# Patient Record
Sex: MALE | Race: WHITE
[De-identification: names, ages, dates, MRNs, and addresses within clinical notes are randomized per-mention and may not be internally consistent; named-entity substitution may affect disease eponyms.]

---

## 2019-01-02 ENCOUNTER — HOSPITAL ENCOUNTER (OUTPATIENT)
Age: 59
End: 2019-01-02
Payer: COMMERCIAL

## 2019-01-02 DIAGNOSIS — R20.2: Primary | ICD-10-CM

## 2019-01-02 DIAGNOSIS — M25.522: ICD-10-CM

## 2019-01-02 PROCEDURE — 95886 MUSC TEST DONE W/N TEST COMP: CPT

## 2019-01-02 PROCEDURE — 95910 NRV CNDJ TEST 7-8 STUDIES: CPT

## 2023-08-27 PROBLEM — N52.9 MALE ERECTILE DISORDER OF ORGANIC ORIGIN: Status: ACTIVE | Noted: 2023-08-27

## 2023-08-27 PROBLEM — M25.512 SHOULDER PAIN, BILATERAL: Status: ACTIVE | Noted: 2023-08-27

## 2023-08-27 PROBLEM — M25.511 SHOULDER PAIN, BILATERAL: Status: ACTIVE | Noted: 2023-08-27

## 2023-08-27 PROBLEM — R06.83 SNORING: Status: ACTIVE | Noted: 2023-08-27

## 2023-08-27 PROBLEM — S09.93XA INJURY OF FACE: Status: ACTIVE | Noted: 2023-08-27

## 2023-08-27 PROBLEM — G45.9 BRAIN TIA: Status: ACTIVE | Noted: 2023-08-27

## 2023-08-27 RX ORDER — SILDENAFIL 50 MG/1
1 TABLET, FILM COATED ORAL AS NEEDED
COMMUNITY
Start: 2022-08-26 | End: 2023-09-13 | Stop reason: SDUPTHER

## 2023-08-28 ENCOUNTER — OFFICE VISIT (OUTPATIENT)
Dept: PRIMARY CARE | Facility: CLINIC | Age: 63
End: 2023-08-28
Payer: COMMERCIAL

## 2023-08-28 ENCOUNTER — LAB (OUTPATIENT)
Dept: LAB | Facility: LAB | Age: 63
End: 2023-08-28
Payer: COMMERCIAL

## 2023-08-28 VITALS
OXYGEN SATURATION: 99 % | HEIGHT: 71 IN | DIASTOLIC BLOOD PRESSURE: 80 MMHG | SYSTOLIC BLOOD PRESSURE: 120 MMHG | BODY MASS INDEX: 29.18 KG/M2 | HEART RATE: 64 BPM | WEIGHT: 208.4 LBS

## 2023-08-28 DIAGNOSIS — Z12.5 SCREENING FOR PROSTATE CANCER: ICD-10-CM

## 2023-08-28 DIAGNOSIS — Z00.00 WELLNESS EXAMINATION: Primary | ICD-10-CM

## 2023-08-28 DIAGNOSIS — Z00.00 WELLNESS EXAMINATION: ICD-10-CM

## 2023-08-28 LAB
ALANINE AMINOTRANSFERASE (SGPT) (U/L) IN SER/PLAS: 28 U/L (ref 10–52)
ALBUMIN (G/DL) IN SER/PLAS: 4.5 G/DL (ref 3.4–5)
ALKALINE PHOSPHATASE (U/L) IN SER/PLAS: 49 U/L (ref 33–136)
ANION GAP IN SER/PLAS: 11 MMOL/L (ref 10–20)
ASPARTATE AMINOTRANSFERASE (SGOT) (U/L) IN SER/PLAS: 25 U/L (ref 9–39)
BILIRUBIN TOTAL (MG/DL) IN SER/PLAS: 0.6 MG/DL (ref 0–1.2)
CALCIUM (MG/DL) IN SER/PLAS: 9.5 MG/DL (ref 8.6–10.3)
CARBON DIOXIDE, TOTAL (MMOL/L) IN SER/PLAS: 29 MMOL/L (ref 21–32)
CHLORIDE (MMOL/L) IN SER/PLAS: 102 MMOL/L (ref 98–107)
CREATININE (MG/DL) IN SER/PLAS: 0.83 MG/DL (ref 0.5–1.3)
ERYTHROCYTE DISTRIBUTION WIDTH (RATIO) BY AUTOMATED COUNT: 13.8 % (ref 11.5–14.5)
ERYTHROCYTE MEAN CORPUSCULAR HEMOGLOBIN CONCENTRATION (G/DL) BY AUTOMATED: 32 G/DL (ref 32–36)
ERYTHROCYTE MEAN CORPUSCULAR VOLUME (FL) BY AUTOMATED COUNT: 88 FL (ref 80–100)
ERYTHROCYTES (10*6/UL) IN BLOOD BY AUTOMATED COUNT: 5.41 X10E12/L (ref 4.5–5.9)
GFR MALE: >90 ML/MIN/1.73M2
GLUCOSE (MG/DL) IN SER/PLAS: 95 MG/DL (ref 74–99)
HEMATOCRIT (%) IN BLOOD BY AUTOMATED COUNT: 47.5 % (ref 41–52)
HEMOGLOBIN (G/DL) IN BLOOD: 15.2 G/DL (ref 13.5–17.5)
LEUKOCYTES (10*3/UL) IN BLOOD BY AUTOMATED COUNT: 7.1 X10E9/L (ref 4.4–11.3)
PLATELETS (10*3/UL) IN BLOOD AUTOMATED COUNT: 239 X10E9/L (ref 150–450)
POTASSIUM (MMOL/L) IN SER/PLAS: 4.5 MMOL/L (ref 3.5–5.3)
PROSTATE SPECIFIC ANTIGEN,SCREEN: 0.64 NG/ML (ref 0–4)
PROTEIN TOTAL: 7.1 G/DL (ref 6.4–8.2)
SODIUM (MMOL/L) IN SER/PLAS: 137 MMOL/L (ref 136–145)
UREA NITROGEN (MG/DL) IN SER/PLAS: 21 MG/DL (ref 6–23)

## 2023-08-28 PROCEDURE — 1036F TOBACCO NON-USER: CPT | Performed by: FAMILY MEDICINE

## 2023-08-28 PROCEDURE — 99396 PREV VISIT EST AGE 40-64: CPT | Performed by: FAMILY MEDICINE

## 2023-08-28 PROCEDURE — 36415 COLL VENOUS BLD VENIPUNCTURE: CPT

## 2023-08-28 PROCEDURE — 80053 COMPREHEN METABOLIC PANEL: CPT

## 2023-08-28 PROCEDURE — 84153 ASSAY OF PSA TOTAL: CPT

## 2023-08-28 PROCEDURE — 85027 COMPLETE CBC AUTOMATED: CPT

## 2023-08-28 NOTE — PROGRESS NOTES
"Subjective   Patient ID: Pro Pino is a 63 y.o. male who presents for Annual Exam.    HPI   Meds-0 nkda  Op-[facility]-fell off ladder    Alcohol-3/wk, tob-never, illicits-0 divord-monogamous  Kids aw, sibs x2 aw, dad dec chf late 70s, mom aw\"on a cruise right now!\"  Colon due 27    Review of Systems  General-no fatigue weight to within 10 pounds  ENT no problems with vision swallowing  Cardiac no chest pains palpitations change in exercise tolerance or capacity  Pulmonary no cough shortness of breath  GI no heartburn or abdominal pain  Musculoskeletal no joint pains    Objective   /80   Pulse 64   Ht 1.803 m (5' 11\")   Wt 94.5 kg (208 lb 6.4 oz)   SpO2 99%   BMI 29.07 kg/m²     Physical Exam  General:  Alert, No acute distress. Appears stated age  Eye:  Pupils are equal, round and reactive to light, Extraocular movements are intact, Normal conjunctiva.    Neck:  Supple, Non-tender, No carotid bruit, No jugular venous distention, No lymphadenopathy, No thyromegaly.    Respiratory:  Lungs are clear to auscultation, Respirations are non-labored, Breath sounds are equal.    Cardiovascular:  Normal rate, Regular rhythm, No murmur.    Gastrointestinal:  Soft, Non-tender, No organomegaly. No solid or pulsatile mass  Integumentary:  Warm, Dry. No concerning lesions on exposed areas  Neurologic:  Alert, Oriented.  Gross and fine motor intact, CN 2-12 intact  Psychiatric:  Cooperative, Appropriate mood & affect.  Assessment/Plan   Problem List Items Addressed This Visit    None  Visit Diagnoses       Wellness examination    -  Primary    Relevant Orders    CBC (Completed)    Comprehensive Metabolic Panel (Completed)    Follow Up In Primary Care - Established    Screening for prostate cancer        Relevant Orders    Prostate Specific Antigen, Screen (Completed)               "

## 2023-08-29 ENCOUNTER — TELEPHONE (OUTPATIENT)
Dept: PRIMARY CARE | Facility: CLINIC | Age: 63
End: 2023-08-29
Payer: COMMERCIAL

## 2023-09-13 ENCOUNTER — TELEPHONE (OUTPATIENT)
Dept: PRIMARY CARE | Facility: CLINIC | Age: 63
End: 2023-09-13
Payer: COMMERCIAL

## 2023-09-13 DIAGNOSIS — N52.9 MALE ERECTILE DISORDER OF ORGANIC ORIGIN: Primary | ICD-10-CM

## 2023-09-13 RX ORDER — SILDENAFIL 50 MG/1
50 TABLET, FILM COATED ORAL AS NEEDED
Qty: 10 TABLET | Refills: 2 | Status: SHIPPED | OUTPATIENT
Start: 2023-09-13

## 2024-01-12 ENCOUNTER — OFFICE VISIT (OUTPATIENT)
Dept: URGENT CARE | Facility: CLINIC | Age: 64
End: 2024-01-12
Payer: COMMERCIAL

## 2024-01-12 VITALS
HEART RATE: 62 BPM | SYSTOLIC BLOOD PRESSURE: 128 MMHG | OXYGEN SATURATION: 97 % | DIASTOLIC BLOOD PRESSURE: 85 MMHG | BODY MASS INDEX: 28.7 KG/M2 | WEIGHT: 205 LBS | TEMPERATURE: 97.9 F | HEIGHT: 71 IN | RESPIRATION RATE: 18 BRPM

## 2024-01-12 DIAGNOSIS — J20.9 ACUTE BRONCHITIS, UNSPECIFIED ORGANISM: Primary | ICD-10-CM

## 2024-01-12 PROCEDURE — 99213 OFFICE O/P EST LOW 20 MIN: CPT | Mod: 25 | Performed by: NURSE PRACTITIONER

## 2024-01-12 RX ORDER — BENZONATATE 100 MG/1
100-200 CAPSULE ORAL EVERY 8 HOURS PRN
Qty: 60 CAPSULE | Refills: 0 | Status: SHIPPED | OUTPATIENT
Start: 2024-01-12

## 2024-01-12 RX ORDER — AZITHROMYCIN 250 MG/1
TABLET, FILM COATED ORAL
Qty: 6 EACH | Refills: 0 | Status: SHIPPED | OUTPATIENT
Start: 2024-01-12

## 2024-01-12 NOTE — PROGRESS NOTES
[facility]  Ai ROBERTO Draper, APRN-CNP     Visit Note - 1/12/2024 10:05 AM   This note was generated with voice recognition software and may contain errors including spelling, grammar, syntax, and misrecognization of what was dictated.    Patient: Pro Pino, MRN: [id number], 63 y.o., male   PCP: Michael Bonilla MD  ------------------------------------  ALLERGIES: No Known Allergies     CURRENT MEDICATIONS:   Current Outpatient Medications   Medication Instructions    azithromycin (Zithromax Z-Philip) 250 mg tablet Take 2 tablets by mouth at once on day 1, then 1 tablet once a day on days 2-5. Take with a meal.    benzonatate (TESSALON) 100-200 mg, oral, Every 8 hours PRN, Do not crush or chew.    sildenafil (VIAGRA) 50 mg, oral, As needed, TAKE 1 TABLET DAILY 1 HOUR BEFORE NEEDED.     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   SOCIAL HX:    reports that he has never smoked. He has never used smokeless tobacco. Retired. Has a daughter. Tries to walk 3 miles daily.   ------------------------------------  CHIEF COMPLAINT:   Chief Complaint   Patient presents with    Cough     Cough, nasal drainage x 10 days       HISTORY OF PRESENT ILLNESS: The history was obtained from patientSudha Mast is a 63 y.o. male, who presents with a chief complaint of a persistent, non-productive cough, chest congestion, and fatigue x 1.5 weeks. Reports he had a sore throat, fevers/chills, and body aches at onset, but these has improved. Has mild nasal congestion (yellow mucus), ears  "occasionally feel full, and he still feels \"run down.\" Denies any headaches, abdominal pain, chest pain, wheezing/shortness of breath, rashes, urinary symptoms, nausea/vomiting, and diarrhea. Denies any lightheadedness or dizziness; no changes in mental status. No swelling in legs. Appetite is normal; is able to eat and drink fluids without difficulty; denies loss of sense of taste or smell. Reports symptoms are unchanged since onset- \"the cough just won't go away.\" Has been taking  Dayquil and Ibuprofen  without much relief; no other over-the-counter medications or home remedies for symptom management. No known ill contacts. Has received the COVID vaccine x 1 . No known history of COVID infection.  Is not a smoker.  No known history of asthma/COPD/respiratory issues.      REVIEW OF SYSTEMS:  10 systems reviewed negative with exception of history of present illness as listed above.    TODAY'S VITALS: /85 (BP Location: Left arm, Patient Position: Sitting, BP Cuff Size: Large adult)   Pulse 62   Temp 36.6 °C (97.9 °F) (Temporal)   Resp 18   Ht 1.803 m (5' 11\")   Wt 93 kg (205 lb)   SpO2 97%   BMI 28.59 kg/m²     PHYSICAL EXAMINATION:  General:  Mildly ill-appearing, well nourished male; alert and oriented; in no acute distress. Sitting comfortably on exam table. Non-dyspneic.   Eyes:  Pupils equal, round and reactive to light. No conjunctival erythema; no scleral icterus.   HENT: No sinus tenderness; + mild audible nasal congestion. Airway patent, Bilat TMs unremarkable, ear canals clear/unremarkable bilaterally. Nasal mucosa mildly injected and edematous. Oral mucosa moist. Posterior pharynx mildly injected but without vesicles or oropharyngeal exudate aside from PND. Uvula is midline. Managing oral secretions without difficulty.  Neck:  Supple. Tender, mobile anterior cervical lymphadenopathy bilat. Trachea is midline.  Respiratory:  Respirations easy and unlabored, Breath sounds equal. Lungs clear to " auscultation; no wheezing, rhonchi, or rales. Has good air movement throughout. Harsh, non-productive cough noted- having coughing fits during visit. Non-dyspneic with ambulation; able to maintain SpO2.  Cardiovascular:  Normal rate, Regular rhythm. Normal S1S2. No m/r/g. No peripheral edema.   Gastrointestinal:  Soft, non-tender, non-distended; no palpable masses or organomegaly. Bowel sounds normoactive.  Musculoskeletal:  Grossly normal; appropriate for age.     Integumentary:  Pink, warm, dry, and intact. No rashes or skin discoloration appreciated. Good skin turgor.  Neurologic:  Alert and oriented, no gross deficits.    Cognition and Speech:  Oriented, Speech clear and coherent.    Psychiatric:  Cooperative, Appropriate mood & affect.       ------------------------------------  Medical Decision Making  LABORATORY or RADIOLOGICAL IMAGING ORDERS/RESULTS:   None.    IMPRESSION/PLAN:  Course: Worsening; stable    1. Acute bronchitis, unspecified organism  - azithromycin (Zithromax Z-Philip) 250 mg tablet; Take 2 tablets by mouth at once on day 1, then 1 tablet once a day on days 2-5. Take with a meal.  Dispense: 6 each; Refill: 0  - benzonatate (Tessalon) 100 mg capsule; Take 1-2 capsules (100-200 mg) by mouth every 8 hours if needed for cough. Do not crush or chew.  Dispense: 60 capsule; Refill: 0    No red flags on exam today. I have reviewed the  COVID-19 algorithm, and counseled pt on COVID-19 current recommendations. Symptoms consistent with acute bronchitis, but reviewed other potential etiologies. Patient declines testing for COVID, but urged close monitoring and precautionary measures- should consider home testing. Due to severity and duration of symptoms, will begin treatment with Zithromax today; will also start cough medication (Benzonatate) for PRN use. Instructed to push fluids, rest, and to use appropriate over the counter medications as needed for management of symptoms - plain Mucinex may be  helpful. Reviewed instructions for self-isolation and continued monitoring. Reviewed red flags to monitor for, counseled on potential adverse reactions of treatments, expectations for improvement in sxs, and advised to follow-up with primary care provider in 2-3 days if symptoms persist, or to seek care sooner if worsening or if any additional concerns/red flags develop.  Patient agreed with plan of care; questions were encouraged and answered.     SUNDAY Segura-CNP   Advanced Practice Provider  [facility]

## 2024-08-30 ENCOUNTER — APPOINTMENT (OUTPATIENT)
Dept: PRIMARY CARE | Facility: CLINIC | Age: 64
End: 2024-08-30
Payer: COMMERCIAL

## 2024-09-06 ENCOUNTER — APPOINTMENT (OUTPATIENT)
Dept: PRIMARY CARE | Facility: CLINIC | Age: 64
End: 2024-09-06
Payer: COMMERCIAL

## 2024-09-06 ENCOUNTER — LAB (OUTPATIENT)
Facility: LAB | Age: 64
End: 2024-09-06
Payer: COMMERCIAL

## 2024-09-06 VITALS
DIASTOLIC BLOOD PRESSURE: 64 MMHG | OXYGEN SATURATION: 97 % | HEART RATE: 75 BPM | BODY MASS INDEX: 28.5 KG/M2 | SYSTOLIC BLOOD PRESSURE: 112 MMHG | WEIGHT: 203.6 LBS | HEIGHT: 71 IN

## 2024-09-06 DIAGNOSIS — Z12.5 SCREENING FOR PROSTATE CANCER: ICD-10-CM

## 2024-09-06 DIAGNOSIS — Z00.00 WELLNESS EXAMINATION: ICD-10-CM

## 2024-09-06 DIAGNOSIS — Z00.00 WELLNESS EXAMINATION: Primary | ICD-10-CM

## 2024-09-06 LAB
ALBUMIN SERPL BCP-MCNC: 4.5 G/DL (ref 3.4–5)
ALP SERPL-CCNC: 51 U/L (ref 33–136)
ALT SERPL W P-5'-P-CCNC: 31 U/L (ref 10–52)
ANION GAP SERPL CALC-SCNC: 11 MMOL/L (ref 10–20)
AST SERPL W P-5'-P-CCNC: 25 U/L (ref 9–39)
BILIRUB SERPL-MCNC: 0.5 MG/DL (ref 0–1.2)
BUN SERPL-MCNC: 23 MG/DL (ref 6–23)
CALCIUM SERPL-MCNC: 9.5 MG/DL (ref 8.6–10.3)
CHLORIDE SERPL-SCNC: 105 MMOL/L (ref 98–107)
CHOLEST SERPL-MCNC: 179 MG/DL (ref 0–199)
CHOLESTEROL/HDL RATIO: 4.1
CO2 SERPL-SCNC: 28 MMOL/L (ref 21–32)
CREAT SERPL-MCNC: 0.78 MG/DL (ref 0.5–1.3)
EGFRCR SERPLBLD CKD-EPI 2021: >90 ML/MIN/1.73M*2
ERYTHROCYTE [DISTWIDTH] IN BLOOD BY AUTOMATED COUNT: 14.1 % (ref 11.5–14.5)
GLUCOSE SERPL-MCNC: 89 MG/DL (ref 74–99)
HCT VFR BLD AUTO: 44.4 % (ref 41–52)
HDLC SERPL-MCNC: 44 MG/DL
HGB BLD-MCNC: 14.4 G/DL (ref 13.5–17.5)
LDLC SERPL CALC-MCNC: 110 MG/DL
MCH RBC QN AUTO: 28.5 PG (ref 26–34)
MCHC RBC AUTO-ENTMCNC: 32.4 G/DL (ref 32–36)
MCV RBC AUTO: 88 FL (ref 80–100)
NON HDL CHOLESTEROL: 135 MG/DL (ref 0–149)
NRBC BLD-RTO: 0 /100 WBCS (ref 0–0)
PLATELET # BLD AUTO: 238 X10*3/UL (ref 150–450)
POTASSIUM SERPL-SCNC: 4.5 MMOL/L (ref 3.5–5.3)
PROT SERPL-MCNC: 7.3 G/DL (ref 6.4–8.2)
PSA SERPL-MCNC: 0.86 NG/ML
RBC # BLD AUTO: 5.05 X10*6/UL (ref 4.5–5.9)
SODIUM SERPL-SCNC: 139 MMOL/L (ref 136–145)
TRIGL SERPL-MCNC: 126 MG/DL (ref 0–149)
VLDL: 25 MG/DL (ref 0–40)
WBC # BLD AUTO: 5.7 X10*3/UL (ref 4.4–11.3)

## 2024-09-06 PROCEDURE — 80061 LIPID PANEL: CPT

## 2024-09-06 PROCEDURE — 99396 PREV VISIT EST AGE 40-64: CPT | Performed by: FAMILY MEDICINE

## 2024-09-06 PROCEDURE — 80053 COMPREHEN METABOLIC PANEL: CPT

## 2024-09-06 PROCEDURE — 84153 ASSAY OF PSA TOTAL: CPT

## 2024-09-06 PROCEDURE — 36415 COLL VENOUS BLD VENIPUNCTURE: CPT

## 2024-09-06 PROCEDURE — 85027 COMPLETE CBC AUTOMATED: CPT

## 2024-09-06 PROCEDURE — 1036F TOBACCO NON-USER: CPT | Performed by: FAMILY MEDICINE

## 2024-09-06 PROCEDURE — 3008F BODY MASS INDEX DOCD: CPT | Performed by: FAMILY MEDICINE

## 2024-09-06 NOTE — PROGRESS NOTES
"Subjective   Patient ID: Pro Pino is a 64 y.o. male who presents for Annual Exam.    HPI   Since the last office visit there have been no interval operations, hospitalizations, important illnesses or injuries.  Fell off ladder 5 yrs ago  Tob-0  Alc2/week  Ilicit-0  Exercise regularly  \  Retired sept 2023 from [employer].  Enjoying life.  Not working  Review of Systems  General-no fatigue weight to within 10 pounds  ENT no problems with vision swallowing  Cardiac no chest pains palpitations change in exercise tolerance or capacity  Pulmonary no cough shortness of breath  GI no heartburn or abdominal pain  Musculoskeletal no joint pains  Objective   /64   Pulse 75   Ht 1.803 m (5' 11\")   Wt 92.4 kg (203 lb 9.6 oz)   SpO2 97%   BMI 28.40 kg/m²     Physical Exam  General:  Alert, No acute distress. Appears stated age  Eye:  Pupils are equal, round and reactive to light, Extraocular movements are intact, Normal conjunctiva.    Neck:  Supple, Non-tender, No carotid bruit, No jugular venous distention, No lymphadenopathy, No thyromegaly.    Respiratory:  Lungs are clear to auscultation, Respirations are non-labored, Breath sounds are equal.    Cardiovascular:  Normal rate, Regular rhythm, No murmur.    Gastrointestinal:  Soft, Non-tender, No organomegaly. No solid or pulsatile mass  Integumentary:  Warm, Dry. No concerning lesions on exposed areas  Neurologic:  Alert, Oriented.  Gross and fine motor intact, CN 2-12 intact  Psychiatric:  Cooperative, Appropriate mood & affect.  Assessment/Plan   Problem List Items Addressed This Visit    None  Visit Diagnoses         Codes    Wellness examination    -  Primary Z00.00    Relevant Orders    CBC    Comprehensive Metabolic Panel    Lipid Panel    Follow Up In Primary Care    Screening for prostate cancer     Z12.5    Relevant Orders    Prostate Specific Antigen, Screen               "

## 2024-09-10 ENCOUNTER — TELEPHONE (OUTPATIENT)
Age: 64
End: 2024-09-10
Payer: COMMERCIAL

## 2024-09-10 NOTE — TELEPHONE ENCOUNTER
----- Message from Michael Bonilla sent at 9/9/2024  6:33 PM EDT -----  Please let patient know labs are all okay

## 2025-09-09 ENCOUNTER — APPOINTMENT (OUTPATIENT)
Age: 65
End: 2025-09-09
Payer: COMMERCIAL